# Patient Record
Sex: MALE | Employment: FULL TIME | URBAN - METROPOLITAN AREA
[De-identification: names, ages, dates, MRNs, and addresses within clinical notes are randomized per-mention and may not be internally consistent; named-entity substitution may affect disease eponyms.]

---

## 2017-05-26 ENCOUNTER — OP HISTORICAL/CONVERTED ENCOUNTER (OUTPATIENT)
Dept: OTHER | Age: 67
End: 2017-05-26

## 2020-12-17 ENCOUNTER — OFFICE VISIT (OUTPATIENT)
Dept: PRIMARY CARE CLINIC | Age: 70
End: 2020-12-17
Payer: COMMERCIAL

## 2020-12-17 VITALS — HEART RATE: 66 BPM | OXYGEN SATURATION: 98 % | TEMPERATURE: 97.7 F

## 2020-12-17 DIAGNOSIS — J02.9 VIRAL PHARYNGITIS: ICD-10-CM

## 2020-12-17 DIAGNOSIS — J02.9 SORE THROAT: Primary | ICD-10-CM

## 2020-12-17 LAB
S PYO AG THROAT QL: NEGATIVE
VALID INTERNAL CONTROL?: YES

## 2020-12-17 PROCEDURE — 87880 STREP A ASSAY W/OPTIC: CPT | Performed by: NURSE PRACTITIONER

## 2020-12-17 PROCEDURE — 99202 OFFICE O/P NEW SF 15 MIN: CPT | Performed by: NURSE PRACTITIONER

## 2020-12-17 NOTE — PROGRESS NOTES
Estee Trammell is a 72 y.o. male who was seen in clinic today (12/17/2020) for an acute visit. Assessment/Plan:            * Patient given detailed CDC instructions contained within After Visit Summary    Diagnoses and all orders for this visit:    1. Sore throat  -     AMB POC RAPID STREP A    2. Viral pharyngitis       Negative rapid strep. He will go to [facility] to get a rapid covid test.     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 He notes 1 day of sore throat and enlarge uvula. He is on antibiotics he gets from [facility] due to being allergic to many abx. He denies a recent history/current: loss of smell/taste, cough, fever, wheezing, SOB, and DUPREE. Travel Screening     Question   Response    In the last month, have you been in contact with someone who was confirmed or suspected to have Coronavirus / COVID-19? No / Unsure    Have you had a COVID-19 viral test in the last 14 days? Do you have any of the following new or worsening symptoms? Sore throat    Have you traveled internationally in the last month? No      Travel History   Travel since 11/17/20     No documented travel since 11/17/20          ROS - Pertinent items are noted in HPI    There is no problem list on file for this patient. Home Medications    Medication Sig Start Date End Date Taking? Authorizing Provider   ASPIRIN PO Take  by mouth. Yes Provider, Historical      Allergies   Allergen Reactions    Keflex [Cephalexin] Unknown (comments)    Pcn [Penicillins] Unknown (comments)    Plavix [Clopidogrel] Unknown (comments)          Objective:   Physical Exam  General:   alert, cooperative, no distress   Ears: Normal external ear canals AU   Sinuses: Normal paranasal sinuses without tenderness   Mouth:  Lips, mucosa, and tongue normal. Teeth and gums normal: oropharynx: erythema without exudate, uvula appears normal in size.      Neck: supple, symmetrical, trachea midline. Heart: normal rate, regular rhythm, normal S1, S2, no murmurs, rubs, clicks or gallops. Lungs: clear to auscultation bilaterally       Visit Vitals  Pulse 66   Temp 97.7 °F (36.5 °C)   SpO2 98%         Disclaimer:        Medication risks/benefits/costs/interactions/alternatives discussed with patient. He was given an after visit summary which includes diagnoses, current medications, & vitals. He expressed understanding with the diagnosis and plan. Aspects of this note may have been generated using voice recognition software. Despite editing, there may be some syntax errors.        Greg Chicas, MARIELLE

## 2023-04-27 ENCOUNTER — OFFICE VISIT (OUTPATIENT)
Dept: UROLOGY | Age: 73
End: 2023-04-27
Payer: COMMERCIAL

## 2023-04-27 VITALS
SYSTOLIC BLOOD PRESSURE: 116 MMHG | OXYGEN SATURATION: 99 % | DIASTOLIC BLOOD PRESSURE: 57 MMHG | WEIGHT: 163 LBS | HEIGHT: 71 IN | TEMPERATURE: 92.5 F | BODY MASS INDEX: 22.82 KG/M2 | RESPIRATION RATE: 16 BRPM | HEART RATE: 73 BPM

## 2023-04-27 DIAGNOSIS — R31.29 MICROHEMATURIA: ICD-10-CM

## 2023-04-27 DIAGNOSIS — R39.9 LOWER URINARY TRACT SYMPTOMS (LUTS): Primary | ICD-10-CM

## 2023-04-27 DIAGNOSIS — N41.1 CHRONIC PROSTATITIS: ICD-10-CM

## 2023-04-27 PROBLEM — E04.1 THYROID NODULE: Status: ACTIVE | Noted: 2019-10-07

## 2023-04-27 PROBLEM — H25.813 COMBINED FORMS OF AGE-RELATED CATARACT OF BOTH EYES: Status: ACTIVE | Noted: 2022-04-08

## 2023-04-27 PROBLEM — H40.003 GLAUCOMA SUSPECT OF BOTH EYES: Status: ACTIVE | Noted: 2019-01-01

## 2023-04-27 PROBLEM — I25.10 ARTERIOSCLEROSIS OF CORONARY ARTERY: Status: ACTIVE | Noted: 2018-05-07

## 2023-04-27 PROBLEM — R73.02 IMPAIRED GLUCOSE TOLERANCE: Status: ACTIVE | Noted: 2022-01-24

## 2023-04-27 LAB
BILIRUB UR QL: NEGATIVE
GLUCOSE UR-MCNC: NEGATIVE MG/DL
KETONES P FAST UR STRIP-MCNC: NEGATIVE MG/DL
PH UR STRIP: 6.5 [PH] (ref 4.6–8)
PROT UR QL STRIP: NEGATIVE
SP GR UR STRIP: 1.02 (ref 1–1.03)
UA UROBILINOGEN AMB POC: NORMAL (ref 0.2–1)
URINALYSIS CLARITY POC: CLEAR
URINALYSIS COLOR POC: YELLOW
URINE BLOOD POC: NORMAL
URINE LEUKOCYTES POC: NEGATIVE
URINE NITRITES POC: NEGATIVE

## 2023-04-27 PROCEDURE — 99202 OFFICE O/P NEW SF 15 MIN: CPT | Performed by: NURSE PRACTITIONER

## 2023-04-27 PROCEDURE — 81003 URINALYSIS AUTO W/O SCOPE: CPT | Performed by: NURSE PRACTITIONER

## 2023-04-27 PROCEDURE — 1123F ACP DISCUSS/DSCN MKR DOCD: CPT | Performed by: NURSE PRACTITIONER

## 2023-04-27 RX ORDER — EZETIMIBE 10 MG/1
10 TABLET ORAL
COMMUNITY

## 2023-04-27 RX ORDER — ROSUVASTATIN CALCIUM 40 MG/1
40 TABLET, COATED ORAL
COMMUNITY

## 2023-04-27 RX ORDER — METOPROLOL SUCCINATE 50 MG/1
50 TABLET, EXTENDED RELEASE ORAL DAILY
COMMUNITY

## 2023-04-27 RX ORDER — DOXYCYCLINE 100 MG/1
100 CAPSULE ORAL 2 TIMES DAILY
Qty: 28 CAPSULE | Refills: 1 | Status: SHIPPED | OUTPATIENT
Start: 2023-04-27 | End: 2023-05-18

## 2023-04-27 NOTE — PROGRESS NOTES
HISTORY OF PRESENT ILLNESS  Calista Sarah is a 68 y.o. male. Chief Complaint   Patient presents with    New Patient    Urinary Frequency    Recurrent UTI    Urgency     Past Medical History:  PMHx (including negatives):  has a past medical history of Burning with urination and Hypercholesterolemia. PSurgHx:  has a past surgical history that includes pr unlisted procedure cardiac surgery. PSocHx:  reports that he has never smoked. He has never used smokeless tobacco. He reports that he does not currently use alcohol. He is a new patient. He was recently travelling to [facility] and has been on vacation. He developed some urinary symptoms suddenly while on the plane. He noted some frequency, slight straining or weak stream, severe dysuria, and urgency. He says overall, he felt poorly and thought he may have had a low grade fever. He was eating spicy foods while on vacation, walking a lot and visiting relatives. He took some Ciprofloxacin 500 mg bid x 8 days but the dysuria is persisting. It is not as bad as it had been but is bothersome. He has some mild lingering frequency and urgency. He has been working on good hydration. He denies gross hematuria, back or flank pain, history of kidney stones, suprapubic pain or discomfort, or ongoing fever. PSA 0.5 in January. He has no family hx of prostate cancer, but father did have a seminoma (diagnosed late in life) and undescended testicle. He is concerned about screening or risk for himself.     Results for orders placed or performed in visit on 04/27/23   AMB POC URINALYSIS DIP STICK AUTO W/O MICRO   Result Value Ref Range    Color (UA POC) Yellow     Clarity (UA POC) Clear     Glucose (UA POC) Negative Negative mg/dL    Bilirubin (UA POC) Negative Negative    Ketones (UA POC) Negative Negative    Specific gravity (UA POC) 1.020 1.001 - 1.035    Blood (UA POC) Small Negative    pH (UA POC) 6.5 4.6 - 8.0    Protein (UA POC) Negative Negative Urobilinogen (UA POC) 0.2 mg/dL 0.2 - 1    Nitrites (UA POC) Negative Negative    Leukocyte esterase (UA POC) Negative Negative       Chronic Conditions Addressed Today       1. Lower urinary tract symptoms (LUTS) - Primary     Overview      Persistent frequency, urgency, straining (improved) despite a short course of abx. Current Assessment & Plan      UA today with no signs of infection. Suspect prostatitis. Patient is given information regarding diagnosis, prevention strategies and treatment, both verbally and in writing. Doxycycline 100 mg bid x 21 days with refill. If symptoms are not improved, he should return for further evaluation. Relevant Medications     doxycycline (VIBRAMYCIN) 100 mg capsule     Other Relevant Orders     AMB POC URINALYSIS DIP STICK AUTO W/O MICRO     URINALYSIS W/MICROSCOPIC    2. Chronic prostatitis     Current Assessment & Plan      UA today with no signs of infection. Suspect prostatitis. Patient is given information regarding diagnosis, prevention strategies and treatment, both verbally and in writing. Doxycycline 100 mg bid x 21 days with refill. If symptoms are not improved, he should return for further evaluation. 3. Microhematuria     Current Assessment & Plan      Dipstick positive for small blood today with LUTS. Suspect prostatitis. Will send for microscopic analysis to determine significance. Relevant Orders     URINALYSIS W/MICROSCOPIC            Review of Systems   Constitutional:  Positive for malaise/fatigue. Negative for chills and fever. Eyes: Negative. Respiratory: Negative. Cardiovascular: Negative. Gastrointestinal:  Negative for abdominal pain, nausea and vomiting. Genitourinary:  Positive for dysuria, frequency and urgency. Negative for flank pain and hematuria. Musculoskeletal:  Negative for back pain. Patient denies the symptoms of COVID-19 per routine screening guidelines.     PHYSICAL EXAM:  CHAVA deferred at this time due to suspected prostatitis. Recommend CHAVA when symptoms are resolved. ASSESSMENT and PLAN  Diagnoses and all orders for this visit:    1. Lower urinary tract symptoms (LUTS)  Assessment & Plan:  UA today with no signs of infection. Suspect prostatitis. Patient is given information regarding diagnosis, prevention strategies and treatment, both verbally and in writing. Doxycycline 100 mg bid x 21 days with refill. If symptoms are not improved, he should return for further evaluation. Orders:  -     doxycycline (VIBRAMYCIN) 100 mg capsule; Take 1 Capsule by mouth two (2) times a day for 21 days.  -     AMB POC URINALYSIS DIP STICK AUTO W/O MICRO  -     URINALYSIS W/MICROSCOPIC    2. Chronic prostatitis  Assessment & Plan:  UA today with no signs of infection. Suspect prostatitis. Patient is given information regarding diagnosis, prevention strategies and treatment, both verbally and in writing. Doxycycline 100 mg bid x 21 days with refill. If symptoms are not improved, he should return for further evaluation. 3. Microhematuria  Assessment & Plan:  Dipstick positive for small blood today with LUTS. Suspect prostatitis. Will send for microscopic analysis to determine significance. Orders:  -     URINALYSIS W/MICROSCOPIC         Concern regarding risk for seminoma. No testicular masses reported, testicles are descended bilaterally. There are no reported aches or discomfort in the scrotum. There is no specific gene linked to testicular cancer, but there are increased risks for men who have a father or brother with the with the diagnosis. Seminoma does usually affect older men. There is no standard or routine screening exam for testicular cancer (pt inquires about CT, MRI, other). Continue to recommend self-testicular exam.    Follow-up and Dispositions    Return for print AVS, PRN if symptoms return or fail to improve.          Christa Moss may have a reminder for a \"due or due soon\" health maintenance. The patient has been encouraged to contact their primary care provider for follow-up on this health maintenance or other necessary and/or routine health screening. Please note that portions of this note were completed with Dragon dictation, the computer voice recognition software. Quite often unanticipated grammatical, syntax, homophones, and other interpretive errors are inadvertently transcribed by the computer software. Please disregard these errors and any other errors that may have escaped final proofreading. Thank you.

## 2023-04-27 NOTE — ASSESSMENT & PLAN NOTE
Dipstick positive for small blood today with LUTS. Suspect prostatitis. Will send for microscopic analysis to determine significance.

## 2023-04-27 NOTE — PROGRESS NOTES
Chief Complaint   Patient presents with    New Patient    Urinary Frequency    Recurrent UTI    Urgency       PHQ-9 score is    Negative    Vitals:    04/27/23 1123   BP: (!) 116/57   Pulse: 73   Resp: 16   Temp: (!) 92.5 °F (33.6 °C)   TempSrc: Temporal   SpO2: 99%   Weight: 163 lb (73.9 kg)   Height: 5' 10.5\" (1.791 m)        1. \"Have you been to the ER, urgent care clinic since your last visit? Hospitalized since your last visit? \" No    2. \"Have you seen or consulted any other health care providers outside of the [address] [place] [address] since your last visit? \" No     3. For patients aged 39-70: Has the patient had a colonoscopy / FIT/ Cologuard? Yes - no Care Gap present      If the patient is female:    4. For patients aged 41-77: Has the patient had a mammogram within the past 2 years? NA - based on age or sex      11. For patients aged 21-65: Has the patient had a pap smear?  NA - based on age or sex

## 2023-04-27 NOTE — PATIENT INSTRUCTIONS
Prostatitis: Care Instructions  Overview     The prostate gland is a small, walnut-shaped organ. It lies just below a man's bladder. It surrounds the urethra, the tube that carries urine through the penis and out of the body. Prostatitis is a painful condition caused by inflammation or infection of the prostate gland. Sometimes the condition is caused by bacteria, but often the cause is not known. Prostatitis caused by bacteria usually is treated with self-care and antibiotics. You may need extended courses of antibiotics (21-30 days). How can you care for yourself at home? If your doctor prescribed antibiotics, take them as directed. Do not stop taking them just because you feel better. You need to take the full course of antibiotics. Take an over-the-counter pain medicine, such as acetaminophen (Tylenol), ibuprofen (Advil, Motrin), or naproxen (Aleve). Be safe with medicines. Read and follow all instructions on the label. Take warm baths to help soothe pain. Straining to pass stools can hurt when your prostate is inflamed. Avoid constipation. Include fruits, vegetables, beans, and whole grains in your diet each day. These foods are high in fiber. Drink plenty of fluids. If you have kidney, heart, or liver disease and have to limit fluids, talk with your doctor before you increase the amount of fluids you drink. Get some exercise every day. Build up slowly to 30 to 60 minutes a day on 5 or more days of the week. Take a fiber supplement, such as Citrucel or Metamucil, every day if needed. Read and follow all instructions on the label. Schedule time each day for a bowel movement. Having a daily routine may help. Take your time and do not strain when having a bowel movement. Avoid alcohol, caffeine, and spicy foods, especially if they make your symptoms worse. There are several different types of prostatitis.   You may be prescribed additional medications if your doctor feels you have a condition called prostatodynia. This is a type of chronic pelvic pain syndrome in which individuals experience the same symptoms as they would with prostatitis, but without the inflammation. This is more common in young and middle aged men. UTI PREVENTION IN MEN:    Hydration: Dehydration is a contributory factor to UTI. Adequate hydration helps to flush the urinary tract of infecting organisms and the subsequent frequent voiding reduces the opportunity for bacterial multiplication. Don't avoid the Urge: urinate when you have the urge. Do not hold your urine. Always try to urinate before bed and as soon as you wake up in the morning. And always try to empty your bladder each time you go. Foreskin: if you are an uncircumcised male, cleaning the foreskin is very important. You want to be diligent about doing this daily, as needed, and always before and after sexual intercourse. You may wish to consider circumcision. Cut down on Caffeine: caffeine is a diuretic and a bladder irritant. It can increase urinary frequency and urgency. In large amounts it can contribute to incontinence and overactivity. Caffeine also causes dehydration which subsequently results in an irritated bladder and increased risk for UTI (alcohol has the same effect). Eliminating caffeine-containing beverages is an important strategy in mitigating your bothersome urinary tract symptoms. Cranberry: supplementation with a quality cranberry product can decrease the amount of UTI's. Cranberries contain something called PAC's (A-type proanthocyanidins) which interfere with the ability of the bacteria to adhere to the the bladder wall. Many juices and supplements do not have enough of this active ingredient, so it is important to purchase a good, quality product. You need 36mg or more of the proanthocyanidins for it to be an effective supplement. You also want to avoid juices high in sugar.   Bacteria feed on sugar so it is better to avoid sugary Cranberry beverages because they may actually make infection worse. Srinivas Gil from [employer] is a good quality product. You can find more information about the product at the following web address. https://[employer]/products/theracran-one-cranberry-capsules    Diabetic control: Poor glycemic control can contribute to voiding dysfunction increase the risk for UTI. Good glycemic control and regular follow up is recommended. Schedule an appointment with your PCP or Endocrinologist.    Smoking: tobacco irritates the bladder lining. That irritation can make voiding symptoms worse (incontinence, overactivity, and urinary frequency in particular). Additionally, there is an increased risk for recurrent UTI or the development of bladder cancer when you smoke.

## 2023-04-27 NOTE — ASSESSMENT & PLAN NOTE
UA today with no signs of infection. Suspect prostatitis. Patient is given information regarding diagnosis, prevention strategies and treatment, both verbally and in writing. Doxycycline 100 mg bid x 21 days with refill. If symptoms are not improved, he should return for further evaluation.

## 2023-04-28 LAB
APPEARANCE UR: CLEAR
BACTERIA #/AREA URNS HPF: NORMAL /[HPF]
BILIRUB UR QL STRIP: NEGATIVE
CASTS URNS QL MICRO: NORMAL /LPF
COLOR UR: YELLOW
EPI CELLS #/AREA URNS HPF: NORMAL /HPF (ref 0–10)
GLUCOSE UR QL STRIP: NEGATIVE
HGB UR QL STRIP: ABNORMAL
KETONES UR QL STRIP: NEGATIVE
LEUKOCYTE ESTERASE UR QL STRIP: NEGATIVE
MICRO URNS: ABNORMAL
NITRITE UR QL STRIP: NEGATIVE
PH UR STRIP: 6.5 [PH] (ref 5–7.5)
PROT UR QL STRIP: NEGATIVE
RBC #/AREA URNS HPF: NORMAL /HPF (ref 0–2)
SP GR UR STRIP: 1.02 (ref 1–1.03)
UROBILINOGEN UR STRIP-MCNC: 0.2 MG/DL (ref 0.2–1)
WBC #/AREA URNS HPF: NORMAL /HPF (ref 0–5)